# Patient Record
Sex: MALE | Race: BLACK OR AFRICAN AMERICAN | Employment: UNEMPLOYED | URBAN - METROPOLITAN AREA
[De-identification: names, ages, dates, MRNs, and addresses within clinical notes are randomized per-mention and may not be internally consistent; named-entity substitution may affect disease eponyms.]

---

## 2024-07-11 ENCOUNTER — CLINICAL SUPPORT (OUTPATIENT)
Dept: EMERGENCY MEDICINE | Facility: HOSPITAL | Age: 64
End: 2024-07-11
Payer: COMMERCIAL

## 2024-07-11 ENCOUNTER — HOSPITAL ENCOUNTER (EMERGENCY)
Facility: HOSPITAL | Age: 64
Discharge: HOME | End: 2024-07-11
Attending: EMERGENCY MEDICINE
Payer: COMMERCIAL

## 2024-07-11 VITALS
SYSTOLIC BLOOD PRESSURE: 127 MMHG | BODY MASS INDEX: 21.67 KG/M2 | HEART RATE: 89 BPM | TEMPERATURE: 97.3 F | WEIGHT: 160 LBS | DIASTOLIC BLOOD PRESSURE: 80 MMHG | HEIGHT: 72 IN | OXYGEN SATURATION: 99 % | RESPIRATION RATE: 16 BRPM

## 2024-07-11 DIAGNOSIS — E86.0 DEHYDRATION: ICD-10-CM

## 2024-07-11 DIAGNOSIS — R42 VERTIGO: Primary | ICD-10-CM

## 2024-07-11 LAB
ALBUMIN SERPL BCP-MCNC: 4.3 G/DL (ref 3.4–5)
ALP SERPL-CCNC: 48 U/L (ref 33–136)
ALT SERPL W P-5'-P-CCNC: 24 U/L (ref 10–52)
ANION GAP SERPL CALC-SCNC: 14 MMOL/L (ref 10–20)
AST SERPL W P-5'-P-CCNC: 21 U/L (ref 9–39)
BASOPHILS # BLD AUTO: 0.06 X10*3/UL (ref 0–0.1)
BASOPHILS NFR BLD AUTO: 0.9 %
BILIRUB SERPL-MCNC: 0.8 MG/DL (ref 0–1.2)
BUN SERPL-MCNC: 17 MG/DL (ref 6–23)
CALCIUM SERPL-MCNC: 10 MG/DL (ref 8.6–10.6)
CARDIAC TROPONIN I PNL SERPL HS: 3 NG/L (ref 0–53)
CHLORIDE SERPL-SCNC: 105 MMOL/L (ref 98–107)
CO2 SERPL-SCNC: 29 MMOL/L (ref 21–32)
CREAT SERPL-MCNC: 1.06 MG/DL (ref 0.5–1.3)
EGFRCR SERPLBLD CKD-EPI 2021: 79 ML/MIN/1.73M*2
EOSINOPHIL # BLD AUTO: 0.08 X10*3/UL (ref 0–0.7)
EOSINOPHIL NFR BLD AUTO: 1.1 %
ERYTHROCYTE [DISTWIDTH] IN BLOOD BY AUTOMATED COUNT: 13.6 % (ref 11.5–14.5)
GLUCOSE SERPL-MCNC: 150 MG/DL (ref 74–99)
HCT VFR BLD AUTO: 47.1 % (ref 41–52)
HGB BLD-MCNC: 16.2 G/DL (ref 13.5–17.5)
HOLD SPECIMEN: NORMAL
IMM GRANULOCYTES # BLD AUTO: 0.03 X10*3/UL (ref 0–0.7)
IMM GRANULOCYTES NFR BLD AUTO: 0.4 % (ref 0–0.9)
LYMPHOCYTES # BLD AUTO: 1.92 X10*3/UL (ref 1.2–4.8)
LYMPHOCYTES NFR BLD AUTO: 27.5 %
MAGNESIUM SERPL-MCNC: 2.06 MG/DL (ref 1.6–2.4)
MCH RBC QN AUTO: 28.1 PG (ref 26–34)
MCHC RBC AUTO-ENTMCNC: 34.4 G/DL (ref 32–36)
MCV RBC AUTO: 82 FL (ref 80–100)
MONOCYTES # BLD AUTO: 0.37 X10*3/UL (ref 0.1–1)
MONOCYTES NFR BLD AUTO: 5.3 %
NEUTROPHILS # BLD AUTO: 4.52 X10*3/UL (ref 1.2–7.7)
NEUTROPHILS NFR BLD AUTO: 64.8 %
NRBC BLD-RTO: 0 /100 WBCS (ref 0–0)
PLATELET # BLD AUTO: 251 X10*3/UL (ref 150–450)
POTASSIUM SERPL-SCNC: 4.3 MMOL/L (ref 3.5–5.3)
PROT SERPL-MCNC: 7.6 G/DL (ref 6.4–8.2)
RBC # BLD AUTO: 5.76 X10*6/UL (ref 4.5–5.9)
SODIUM SERPL-SCNC: 144 MMOL/L (ref 136–145)
WBC # BLD AUTO: 7 X10*3/UL (ref 4.4–11.3)

## 2024-07-11 PROCEDURE — 83735 ASSAY OF MAGNESIUM: CPT | Performed by: EMERGENCY MEDICINE

## 2024-07-11 PROCEDURE — 99283 EMERGENCY DEPT VISIT LOW MDM: CPT

## 2024-07-11 PROCEDURE — 80053 COMPREHEN METABOLIC PANEL: CPT | Performed by: EMERGENCY MEDICINE

## 2024-07-11 PROCEDURE — 99285 EMERGENCY DEPT VISIT HI MDM: CPT | Performed by: EMERGENCY MEDICINE

## 2024-07-11 PROCEDURE — 85025 COMPLETE CBC W/AUTO DIFF WBC: CPT | Performed by: EMERGENCY MEDICINE

## 2024-07-11 PROCEDURE — 84484 ASSAY OF TROPONIN QUANT: CPT

## 2024-07-11 PROCEDURE — 96360 HYDRATION IV INFUSION INIT: CPT

## 2024-07-11 PROCEDURE — 36415 COLL VENOUS BLD VENIPUNCTURE: CPT | Performed by: EMERGENCY MEDICINE

## 2024-07-11 PROCEDURE — 93005 ELECTROCARDIOGRAM TRACING: CPT

## 2024-07-11 PROCEDURE — 2500000004 HC RX 250 GENERAL PHARMACY W/ HCPCS (ALT 636 FOR OP/ED): Mod: SE

## 2024-07-11 RX ADMIN — SODIUM CHLORIDE, POTASSIUM CHLORIDE, SODIUM LACTATE AND CALCIUM CHLORIDE 1000 ML: 600; 310; 30; 20 INJECTION, SOLUTION INTRAVENOUS at 10:02

## 2024-07-11 ASSESSMENT — PAIN SCALES - GENERAL
PAINLEVEL_OUTOF10: 0 - NO PAIN
PAINLEVEL_OUTOF10: 0 - NO PAIN

## 2024-07-11 ASSESSMENT — COLUMBIA-SUICIDE SEVERITY RATING SCALE - C-SSRS
6. HAVE YOU EVER DONE ANYTHING, STARTED TO DO ANYTHING, OR PREPARED TO DO ANYTHING TO END YOUR LIFE?: NO
1. IN THE PAST MONTH, HAVE YOU WISHED YOU WERE DEAD OR WISHED YOU COULD GO TO SLEEP AND NOT WAKE UP?: NO
2. HAVE YOU ACTUALLY HAD ANY THOUGHTS OF KILLING YOURSELF?: NO

## 2024-07-11 ASSESSMENT — PAIN - FUNCTIONAL ASSESSMENT: PAIN_FUNCTIONAL_ASSESSMENT: 0-10

## 2024-07-11 NOTE — DISCHARGE INSTRUCTIONS
If your symptoms continue, it is important for you to follow-up with your provider at [facility] for further management and evaluation of this.  It is also important for you to keep track of the amount of water you are drinking continue to drink water even while you are in the job and out in the sun, if you start to become dizzy or lightheaded or have any new or concerning symptoms please present back to the emergency department.

## 2024-07-11 NOTE — ED TRIAGE NOTES
Pt to ED with c/o dizziness x this morning. Pt states he turned wrong and does not feel he is drinking as much water as he should be. Feels that he and the room is spinning. Denies PMH. Pt states he does feel less dizzy since it happened this morning.

## 2024-07-11 NOTE — ED PROVIDER NOTES
History of Present Illness     History provided by: Patient  Limitations to History: None  External Records Reviewed with Brief Summary:  I reviewed the patient's ED visit from [facility] on 07/02/2024 the patient was evaluated for similar symptoms, came in with vertiginous dizziness that started around 11 AM was given fluids and noted improvement in symptoms.    HPI:  Markus Will is a 64 y.o. male With a past medical history of tobacco use disorder, bipolar, low back pain, right-sided neck pain, history of dizziness associated with dehydration presenting to the emergency department today with concern for dizziness that started this morning prior to going into work.  States he works as a , denies any inhalation of any fumes or pain recently nor did he start working prior to the onset of symptoms.  Notes he notes that this feels very similar to his previous experiences of dizziness that resolved after receiving fluids.  Requesting fluids at this time for resolution of his symptoms.  Describes it as dizziness as the room is spinning in addition to himself.  Denies any headache, changes in vision, chest pain, palpitations, any other symptoms associated with this at this time.  No other associated signs or symptoms reported at this time.    Physical Exam   Triage vitals:  T 36.3 °C (97.3 °F)  HR 97  /84  RR 17  O2 99 % None (Room air)    Physical Exam  Constitutional:       General: He is not in acute distress.     Appearance: Normal appearance. He is not toxic-appearing.   HENT:      Head: Normocephalic and atraumatic.      Mouth/Throat:      Mouth: Mucous membranes are moist.   Eyes:      General: No scleral icterus.     Conjunctiva/sclera: Conjunctivae normal.   Cardiovascular:      Rate and Rhythm: Normal rate and regular rhythm.      Pulses: Normal pulses.   Pulmonary:      Effort: Pulmonary effort is normal.      Breath sounds: Normal breath sounds.   Abdominal:      General: There is no distension.       Palpations: Abdomen is soft.      Tenderness: There is no abdominal tenderness.   Musculoskeletal:         General: Normal range of motion.      Cervical back: Normal range of motion and neck supple.   Skin:     General: Skin is warm and dry.      Capillary Refill: Capillary refill takes less than 2 seconds.   Neurological:      General: No focal deficit present.      Mental Status: He is alert and oriented to person, place, and time.      Cranial Nerves: No cranial nerve deficit.      Sensory: No sensory deficit.      Motor: No weakness.      Coordination: Coordination normal.      Gait: Gait normal.   Psychiatric:         Mood and Affect: Mood normal.         Behavior: Behavior normal.         Thought Content: Thought content normal.         Judgment: Judgment normal.          Medical Decision Making & ED Course   Medical Decision Makin y.o. male with the above past presenting to the emergency department today with concern for dizziness that started this morning.  Vital signs upon arrival reviewed, patient is hemodynamically stable.  Upon reviewing the patient's old records it seems that the patient has had this happen to him multiple times in the past, with resolution of symptoms after receiving a liter bolus of fluids.  Labs were drawn to evaluate for dehydration in addition to electrolyte abnormalities that could lead to the symptoms, CBC and CMP and Mag otherwise reassuring.  Patient's glucose was elevated at 150 however this was a random glucose check.  I did add on a troponin to the patient's labs to evaluate for cardiac etiology however the patient was in a hurry to leave due to symptom resolution and did not want to wait for troponin results to return. EKG without arrhythmia or ischemic findings, see ED course for full interpretation. His initial troponin did come back at 3 which was within normal limits/negative. Wanted to send off a second troponin however patient was in a hurry to leave and  didn't want to await further results. Posterior circulation stroke was considered however the patient had no neurological findings, no focal deficits, no nystagmus, good finger-nose, good cerebellar reflexes, and intact gait, no strength and sensation with resolution of symptoms after fluids posterior circulation stroke is unlikely.  Patient symptoms likely due to dehydration, no electrolyte abnormalities noted.  Patient was provided with strict return precautions in addition to follow-up instructions for which he is agreeable and understanding. His primary care doctor is based out of [place], encouraged soon follow up for continued management of symptoms and adequate hydration with water to prevent return of symptoms, he is agreeable.  ----      Differential diagnoses considered include but are not limited to: Posterior circulation stroke, Vertigo, Dehydration     Social Determinants of Health which Significantly Impact Care: None identified     EKG Independent Interpretation: See ED Course    Independent Result Review and Interpretation: See ED course    Chronic conditions affecting the patient's care: See HPI    The patient was discussed with the following consultants/services: None    Care Considerations: As documented above in [facility]    ED Course:  ED Course as of 07/12/24 1056   Thu Jul 11, 2024   1054 EKG performed at 0852 on July 11, 2024 showed normal sinus rhythm with a rate of 89, right axis deviation, normal intervals, no ST elevations or depressions. [KD]      ED Course User Index  [KD] Katheryn Hurtado DO         Diagnoses as of 07/12/24 1056   Vertigo   Dehydration     Disposition   As a result of the work-up, the patient was discharged home.  he was informed of his diagnosis and instructed to come back with any concerns or worsening of condition.  he and was agreeable to the plan as discussed above.  he was given the opportunity to ask questions.  All of the patient's questions were  answered.    Procedures   Procedures    Patient seen and discussed with ED attending physician.      Seen and discussed with ED Attending  Katheryn Hurtado DO, PGY-2  Emergency Medicine     Katheryn Hurtado DO  Resident  07/12/24 2115

## 2024-07-12 LAB
ATRIAL RATE: 89 BPM
P AXIS: 82 DEGREES
P OFFSET: 185 MS
P ONSET: 128 MS
PR INTERVAL: 172 MS
Q ONSET: 214 MS
QRS COUNT: 14 BEATS
QRS DURATION: 76 MS
QT INTERVAL: 368 MS
QTC CALCULATION(BAZETT): 447 MS
QTC FREDERICIA: 419 MS
R AXIS: 93 DEGREES
T AXIS: 74 DEGREES
T OFFSET: 398 MS
VENTRICULAR RATE: 89 BPM

## 2024-07-20 ENCOUNTER — HOSPITAL ENCOUNTER (EMERGENCY)
Facility: HOSPITAL | Age: 64
Discharge: LEFT BEFORE DISCHARGE | End: 2024-07-20
Payer: COMMERCIAL

## 2024-07-20 PROCEDURE — 4500999001 HC ED NO CHARGE

## 2025-07-23 ENCOUNTER — HOSPITAL ENCOUNTER (EMERGENCY)
Facility: HOSPITAL | Age: 65
Discharge: HOME | End: 2025-07-23
Payer: COMMERCIAL

## 2025-07-23 VITALS
RESPIRATION RATE: 18 BRPM | TEMPERATURE: 98.2 F | HEART RATE: 87 BPM | OXYGEN SATURATION: 98 % | DIASTOLIC BLOOD PRESSURE: 78 MMHG | SYSTOLIC BLOOD PRESSURE: 113 MMHG

## 2025-07-23 DIAGNOSIS — M79.662 PAIN OF LEFT CALF: Primary | ICD-10-CM

## 2025-07-23 PROCEDURE — 2500000001 HC RX 250 WO HCPCS SELF ADMINISTERED DRUGS (ALT 637 FOR MEDICARE OP): Mod: SE

## 2025-07-23 PROCEDURE — 99284 EMERGENCY DEPT VISIT MOD MDM: CPT

## 2025-07-23 PROCEDURE — 99283 EMERGENCY DEPT VISIT LOW MDM: CPT

## 2025-07-23 RX ORDER — CYCLOBENZAPRINE HCL 10 MG
5 TABLET ORAL ONCE
Status: COMPLETED | OUTPATIENT
Start: 2025-07-23 | End: 2025-07-23

## 2025-07-23 RX ORDER — CYCLOBENZAPRINE HCL 5 MG
5 TABLET ORAL 3 TIMES DAILY
Qty: 30 TABLET | Refills: 0 | Status: SHIPPED | OUTPATIENT
Start: 2025-07-23 | End: 2025-08-02

## 2025-07-23 RX ORDER — IBUPROFEN 600 MG/1
600 TABLET, FILM COATED ORAL ONCE
Status: COMPLETED | OUTPATIENT
Start: 2025-07-23 | End: 2025-07-23

## 2025-07-23 RX ADMIN — CYCLOBENZAPRINE 5 MG: 10 TABLET, FILM COATED ORAL at 15:45

## 2025-07-23 RX ADMIN — IBUPROFEN 600 MG: 600 TABLET ORAL at 15:45

## 2025-07-23 NOTE — ED PROVIDER NOTES
History of Present Illness       Limitations to history: None  Independent Historian: patient  External Records Reviewed: EMR, outside records, Care-everywhere    HPI:  HPI   This is a 65-year-old male with a history of schizoaffective disorder bipolar type and developmental delay presents to the ED for evaluation of left calf pain.  States that this has been ongoing for the last 5 days.  He works as a  and is frequently climbing up and down a ladder.  He denies any swelling or tenderness to the anterior aspect of his lower extremity, pain is localized to the posterior aspect and behind the knee.  States that it feels like a knot or spasm.  No significant trauma or injury.  No prior history of DVT or PE.  Patient was seen on 7/19 and had a negative DVT ultrasound.  He has no skin changes, warmth, swelling.  Was prescribed Robaxin which he states has not worked, has been on Flexeril in the past which he says works much better for him.  Is requesting a refill of this.  No chest pain, shortness of breath, dyspnea, abdominal pain, nausea, vomiting, diarrhea.  No fevers or chills.      Physical Exam   Physical Exam  Constitutional:       General: He is not in acute distress.     Appearance: Normal appearance. He is not ill-appearing.   HENT:      Head: Normocephalic and atraumatic.      Nose: Nose normal.      Mouth/Throat:      Mouth: Mucous membranes are moist.     Eyes:      Extraocular Movements: Extraocular movements intact.      Pupils: Pupils are equal, round, and reactive to light.       Cardiovascular:      Rate and Rhythm: Normal rate and regular rhythm.      Pulses: Normal pulses.      Heart sounds: Normal heart sounds.   Pulmonary:      Effort: Pulmonary effort is normal.      Breath sounds: Normal breath sounds.   Abdominal:      Palpations: Abdomen is soft.      Tenderness: There is no abdominal tenderness. There is no guarding.     Musculoskeletal:         General: Tenderness present. No  swelling or deformity.      Right lower leg: No edema.      Left lower leg: No edema.        Legs:       Comments: Pain localized to LLE at posterior aspect of left calf and slightly behind the knee. To pain to anterior. No bony tenderness. No skin changes or warmth. No swelling compared to right. Pulses 2+ PT. Negative homans.      Skin:     General: Skin is warm.      Findings: No bruising or erythema.     Neurological:      General: No focal deficit present.      Mental Status: He is alert.      Cranial Nerves: No cranial nerve deficit.      Sensory: No sensory deficit.      Motor: No weakness.      Gait: Gait normal.          Triage vitals:  T 36.8 °C (98.2 °F)  HR 87  /78  RR 18  O2 98 %          Medical Decision Making & ED Course     ED Course & MDM     Medical Decision Making  Vital signs reviewed, afebrile, normotensive, not tachycardic.  Satting well on room air and speaking full sentences in no acute distress.  History obtained from patient and chart.  Patient had a negative DVT study for left calf pain on 7/19, sent home with Robaxin which she states has slightly reduced symptoms but when he was on Flexeril in the past for similar pain, it improved his pain much quicker. Is requesting a new prescription of flexeril.  Today, his calf is edematous, erythematous, no skin changes.  Negative Homans sign, negative DVT study 4 days ago, low concern warranting repeat imaging today. No bony tenderness or trauma concerning for fracture or dislocation so no indication for XR today. Did offer to get an XR to rule out bony process, patient declined. Also discussed with patient ordering labs to rule out electrolyte abnormality however patient also declined this.  As pain is localized to the calf, I do not suspect rhabdo at this time requiring labs today.  Patient given a dose of Flexeril and ibuprofen and feels comfortable for discharge.  Advised to follow up with PCP.  Patient ambulated independently  without difficulty and was discharged in stable condition.       ----    Visit Vitals  /78 (BP Location: Right arm, Patient Position: Sitting)   Pulse 87   Temp 36.8 °C (98.2 °F) (Oral)   Resp 18   SpO2 98%        Labs Reviewed - No data to display    No orders to display       ED Course:  Diagnoses as of 07/23/25 1643   Pain of left calf     Disposition   As result of the workup, the patient was discharged home.  Patient was informed of their diagnosis and instructed to come back with any concerns or worsening of condition, agreeable to the plan above.  Patient given the opportunity ask questions, all of the patient's questions were answered.      Procedures   Procedures    COMMENT: Please note this report has been produced using speech recognition software and may contain errors related that system including errors in grammar, punctuation, spelling as well as words and phrases that may be inappropriate.  If there are any questions or concerns please feel free to contact the dictating provider for clarification.     Filomena Payne PA-C  Emergency Medicine      Filomena Payne PA-C  07/23/25 1495

## 2025-07-23 NOTE — DISCHARGE INSTRUCTIONS
You were seen today for left calf pain.  Take Flexeril up to 3 times a day for this.  Take ibuprofen and Tylenol as needed.  Return to ED if symptoms worsen or new symptoms arise.